# Patient Record
Sex: MALE | Race: WHITE | Employment: FULL TIME | URBAN - METROPOLITAN AREA
[De-identification: names, ages, dates, MRNs, and addresses within clinical notes are randomized per-mention and may not be internally consistent; named-entity substitution may affect disease eponyms.]

---

## 2021-03-12 ENCOUNTER — OFFICE VISIT (OUTPATIENT)
Dept: FAMILY MEDICINE CLINIC | Facility: CLINIC | Age: 51
End: 2021-03-12
Payer: COMMERCIAL

## 2021-03-12 ENCOUNTER — TELEMEDICINE (OUTPATIENT)
Dept: TELEHEALTH | Age: 51
End: 2021-03-12

## 2021-03-12 VITALS
WEIGHT: 155 LBS | TEMPERATURE: 98 F | SYSTOLIC BLOOD PRESSURE: 133 MMHG | HEIGHT: 70 IN | OXYGEN SATURATION: 99 % | DIASTOLIC BLOOD PRESSURE: 66 MMHG | BODY MASS INDEX: 22.19 KG/M2 | HEART RATE: 66 BPM | RESPIRATION RATE: 16 BRPM

## 2021-03-12 DIAGNOSIS — Z02.9 ADMINISTRATIVE ENCOUNTER: Primary | ICD-10-CM

## 2021-03-12 DIAGNOSIS — H69.83 EUSTACHIAN TUBE DYSFUNCTION, BILATERAL: Primary | ICD-10-CM

## 2021-03-12 DIAGNOSIS — K21.9 GASTROESOPHAGEAL REFLUX DISEASE, UNSPECIFIED WHETHER ESOPHAGITIS PRESENT: ICD-10-CM

## 2021-03-12 DIAGNOSIS — R68.89 JAW SYMPTOM: ICD-10-CM

## 2021-03-12 PROCEDURE — 3078F DIAST BP <80 MM HG: CPT | Performed by: NURSE PRACTITIONER

## 2021-03-12 PROCEDURE — 99202 OFFICE O/P NEW SF 15 MIN: CPT | Performed by: NURSE PRACTITIONER

## 2021-03-12 PROCEDURE — 3008F BODY MASS INDEX DOCD: CPT | Performed by: NURSE PRACTITIONER

## 2021-03-12 PROCEDURE — 3075F SYST BP GE 130 - 139MM HG: CPT | Performed by: NURSE PRACTITIONER

## 2021-03-12 RX ORDER — CYCLOBENZAPRINE HCL 10 MG
10 TABLET ORAL NIGHTLY
Qty: 10 TABLET | Refills: 0 | Status: SHIPPED | OUTPATIENT
Start: 2021-03-12 | End: 2021-03-22

## 2021-03-12 NOTE — PROGRESS NOTES
Patient presented via [facility]. Patient has had left ear pain on and off for several days. Pt states that his hearing is a little decreased in the morning and then clears. He does not note ear congestion sensation.   He has had no illness sx

## 2021-03-12 NOTE — PATIENT INSTRUCTIONS
· You may use Cyclobenzaprine 10 mg nightly as needed for relaxing jaw muscles-this may help with clenching of the jaw before you see the dentist. Onetha Ast may take Ibuprofen 400 mg as well as needed for pain.   · Continue allergy medication as discussed, consid to breathe in through your nose and out through your mouth. Try to inhale enough so that your lower abdomen rises and falls. Count slowly as you exhale. If you find it difficult breathing from your abdomen while sitting up, try lying on the floor.  Put a a moment, breathing deeply and slowly. When you’re ready, shift your attention to your left foot. Follow the same sequence of muscle tension and release. Move slowly up through your body, tammy and relaxing the muscle groups as you go.    It may t close attention to any area of the body that causes you pain or discomfort. Move your focus to the fingers on your right hand and then move up to the wrist, forearm, elbow, upper arm, and shoulder. Repeat for your left arm.  Then move through the neck and outdoors where you can relax without distractions or interruptions. A comfortable position. Get comfortable, but avoid lying down as this may lead to you falling asleep. Sit up with your spine straight, either in a chair or on the floor.  You can also try so you might try sitting up or standing. Close your eyes and let your worries drift away. Imagine your restful place. Picture it as vividly as you can—everything you can see, hear, smell, and feel.  Visualization works best if you incorporate as many senso pose, classes that emphasize slow, steady movement, deep breathing, and gentle stretching are best for stress relief. Ignacio Mingle is a traditional form of yoga.  It features gentle poses, deep relaxation, and meditation, making it suitable for beginners as practiced while you’re doing other things. Rhythmic exercise as a mindfulness relaxation technique  Rhythmic exercise—such as running, walking, rowing, or cycling—is most effective at relieving stress when performed with relaxation in mind.  As with [facility] without caffeine)  · Fatty, fried, or spicy food  · Mint, chocolate, onions, tomatoes, and citrus  · Peppermint  · Any other foods that seem to irritate your stomach or cause you pain  Relieve the pressure  Tips include the following:  · Eat smaller meals,

## 2021-03-12 NOTE — PROGRESS NOTES
HPI:   Bertin Norman is a 48year old male who presents with intermittent bilateral ear pressure and some mild jaw pain.  Ear pressure has been going on for  several  weeks, with resolution as the day continues, but notes waking with bilateral jaw tightne exertion  CARDIOVASCULAR: denies chest pain on exertion  GI: no nausea or abdominal pain, appetite normal  NEURO: denies headaches    EXAM:   /66   Pulse 66   Temp 97.6 °F (36.4 °C) (Temporal)   Resp 16   Ht 5' 10\" (1.778 m)   Wt 155 lb (70.3 kg) mg as well as needed for pain. · Continue allergy medication as discussed, consider saline nasal spray 2-3 times daily to help hydrate nose and improve drainage/hydration.   · Ear pressure may take more time to improve, can use warm pack to outer ears if n difficult breathing from your abdomen while sitting up, try lying on the floor. Put a small book on your stomach, and try to breathe so that the book rises as you inhale and falls as you exhale.    Relaxation technique 2: Progressive muscle relaxation for s up through your body, tammy and relaxing the muscle groups as you go. It may take some practice at first, but try not to tense muscles other than those intended.    Progressive Muscle Relaxation Sequence  The most popular sequence runs as follows: elbow, upper arm, and shoulder. Repeat for your left arm. Then move through the neck and throat, and finally all the regions of your face, the back of the head, and the top of the head.  Pay close attention to your jaw, chin, lips, tongue, nose, cheeks, eye asleep. Sit up with your spine straight, either in a chair or on the floor. You can also try a cross-legged or anais position. A point of focus.  This point can be internal – a feeling or imaginary scene – or something external - a flame or meaningful wor you can see, hear, smell, and feel. Visualization works best if you incorporate as many sensory details as possible, using at least three of your senses.  When visualizing, choose imagery that appeals to you; don’t select images because someone else suggest features gentle poses, deep relaxation, and meditation, making it suitable for beginners as well as anyone primarily looking for stress reduction. Hatha yoga is also reasonably gentle way to relieve stress and is suitable for beginners.  Alternately, look most effective at relieving stress when performed with relaxation in mind. As with meditation, mindfulness requires being fully engaged in the present moment, focusing your mind on how your body feels right now.  As you exercise, focus on the physicality of cause you pain  Relieve the pressure  Tips include the following:  · Eat smaller meals, even if you have to eat more often. · Don’t lie down right after you eat. Wait a few hours for your stomach to empty.   · Don't wear tight belts or tight-fitting clothe

## 2021-11-07 ENCOUNTER — IMAGING SERVICES (OUTPATIENT)
Dept: GENERAL RADIOLOGY | Age: 51
End: 2021-11-07
Attending: INTERNAL MEDICINE

## 2021-11-07 ENCOUNTER — OFFICE VISIT (OUTPATIENT)
Dept: URGENT CARE | Age: 51
End: 2021-11-07

## 2021-11-07 VITALS
HEART RATE: 80 BPM | TEMPERATURE: 97.1 F | SYSTOLIC BLOOD PRESSURE: 140 MMHG | DIASTOLIC BLOOD PRESSURE: 72 MMHG | OXYGEN SATURATION: 97 % | RESPIRATION RATE: 16 BRPM

## 2021-11-07 DIAGNOSIS — S99.921A INJURY OF TOE ON RIGHT FOOT, INITIAL ENCOUNTER: ICD-10-CM

## 2021-11-07 DIAGNOSIS — S99.921A INJURY OF TOE ON RIGHT FOOT, INITIAL ENCOUNTER: Primary | ICD-10-CM

## 2021-11-07 PROCEDURE — 73660 X-RAY EXAM OF TOE(S): CPT | Performed by: RADIOLOGY

## 2021-11-07 PROCEDURE — 99214 OFFICE O/P EST MOD 30 MIN: CPT | Performed by: INTERNAL MEDICINE

## 2021-11-07 ASSESSMENT — PAIN SCALES - GENERAL: PAINLEVEL: 2
